# Patient Record
Sex: MALE | Race: WHITE | NOT HISPANIC OR LATINO | Employment: FULL TIME | URBAN - METROPOLITAN AREA
[De-identification: names, ages, dates, MRNs, and addresses within clinical notes are randomized per-mention and may not be internally consistent; named-entity substitution may affect disease eponyms.]

---

## 2017-01-18 ENCOUNTER — ALLSCRIPTS OFFICE VISIT (OUTPATIENT)
Dept: OTHER | Facility: OTHER | Age: 32
End: 2017-01-18

## 2017-02-07 ENCOUNTER — ALLSCRIPTS OFFICE VISIT (OUTPATIENT)
Dept: RADIOLOGY | Facility: CLINIC | Age: 32
End: 2017-02-07
Payer: COMMERCIAL

## 2017-02-13 ENCOUNTER — GENERIC CONVERSION - ENCOUNTER (OUTPATIENT)
Dept: OTHER | Facility: OTHER | Age: 32
End: 2017-02-13

## 2017-08-14 ENCOUNTER — ALLSCRIPTS OFFICE VISIT (OUTPATIENT)
Dept: OTHER | Facility: OTHER | Age: 32
End: 2017-08-14

## 2017-08-29 ENCOUNTER — ALLSCRIPTS OFFICE VISIT (OUTPATIENT)
Dept: RADIOLOGY | Facility: CLINIC | Age: 32
End: 2017-08-29
Payer: COMMERCIAL

## 2017-09-06 ENCOUNTER — GENERIC CONVERSION - ENCOUNTER (OUTPATIENT)
Dept: OTHER | Facility: OTHER | Age: 32
End: 2017-09-06

## 2018-01-03 ENCOUNTER — GENERIC CONVERSION - ENCOUNTER (OUTPATIENT)
Dept: OTHER | Facility: OTHER | Age: 33
End: 2018-01-03

## 2018-01-11 ENCOUNTER — GENERIC CONVERSION - ENCOUNTER (OUTPATIENT)
Dept: OTHER | Facility: OTHER | Age: 33
End: 2018-01-11

## 2018-01-11 NOTE — PROGRESS NOTES
Assessment    1  Disc degeneration, lumbar (722 52) (M51 36)   2  Spondylosis of lumbosacral region without myelopathy or radiculopathy (721 3)   (M47 817)    Plan    Complete risks and benefits of the procedure were reviewed including bleeding, infection, tissue reaction, allergic reaction and nerve injury with verbal and written consent being obtained  Discussion/Summary    Patient is a 64-year-old male with history of low back pain/lumbar radiculopathy  Previous lumbar epidural steroid injection relieved his pain  Today patient states he has some dull achiness on his low back  Most recent MRI finding demonstrates degenerative disc disease/spondylosis at L4-L5, and L5-S1  At this time, I recommend a trial of a bilateral medial branch block at L4-L5, L5-S1  Complete risks and benefits including bleeding, infection, tissue reaction, allergic reaction were discussed  Verbal and written consent were obtained  In addition patient continues to have maintained on Mobic 15 mg daily, and hydrocodone once a day  I explained to the patient that he needs to slowly wean off the hydrocodone in the near future  Possible side effects of new medications were reviewed with the patient/guardian today  The treatment plan was reviewed with the patient/guardian  The patient/guardian understands and agrees with the treatment plan   The treatment plan was reviewed with the patient/guardian  The patient/guardian understands and agrees with the treatment plan   The patient was counseled regarding diagnostic results, instructions for management, risk factor reductions, prognosis, patient and family education, impressions, risks and benefits of treatment options and importance of compliance with treatment  KVNG's low back pain persists despite time, relative rest, activity modification and therapy  Based on the patient's symptoms and examination, I suspect that his pain is being generated by the lumbar facet joints   The facet joints are only one of many possible low back pain generators  Unfortunately, studies have demonstrated that history and examination alone are unreliable  We will schedule the patient for diagnosistc lumbar medial branch blockade using a double block paradigm  If the patient receives significant pain relief of appropriate duration with lidocaine 2% we will confirm with Marcaine 0 75%  If the patient demonstrates appropriate response to medial branch blockade we will schedule for radiofrequency ablation of the blocked nerves to provide long-term relief  In the office today, we reviewed the nature of KVNG's pathology in depth using diagrams and models  We discussed the approach we would use for the medial branch block and provided literature for home review  The patient understands the risks associated with the procedure including bleeding, infection, tissue injury, allergic reaction and paralysis and provided written and verbal consent in the office today  There are risks associated with opiod medications, including dependence, addiction and tolerance  The patient understands and agrees to use these medications only as prescribed  Potential side effects of the medications include, but are not limited to, constipation, drowsiness, addiction, impaired judgment and risk of fatal overdose if not taken as prescribed  Sharing medications is a felony  At this point and time, the patient is showing no signs of addiction, abuse, diversion or suicidal ideation  Chief Complaint    1  Back Pain    History of Present Illness  Patient is a 26 y o male who is here for follow up visit  He has had a series of lumbar epidural steroid injections which provided relief of shooting leg pain  However he continues to complain of dull/achy back pain  Currently patient takes hydrocodone 5/325 mg prn as well as meloxicam 15 mg daily and Lyrica 75 mg twice a day  He states that this has helped with some of the pain   He reports adequate analgesia, he is performing his ADLs, no side effects from the medicines, no aberrant behavior  He denies any loss of bladder or bowel  No fever no chills  No recent trauma  He is in search for other alternatives to help with his low back ache  Referring physician is  Dr Jose Messina   Primary Care physician is  Daniel 46 presents with complaints of gradual onset of intermittent episodes of moderate bilateral lower back pain, described as sharp  On a scale of 1 to 10, the patient rates the pain as 4  Symptoms are improved by opioid analgesics  Symptoms are unchanged  Review of Systems    Constitutional: no fever, no recent weight gain and no recent weight loss  Eyes: no double vision and no blurry vision  Cardiovascular: no chest pain, no palpitations and no lower extremity edema  Respiratory: no complaints of shortness of breath and no wheezing  Musculoskeletal: difficulty walking and joint stiffness, but no muscle weakness, no joint swelling, no limb swelling`, no pain in extremity and no decreased range of motion  Neurological: no dizziness, no difficulty swallowing, no memory loss, no loss of consciousness and no seizures  Gastrointestinal: no nausea, no vomiting, no constipation and no diarrhea  Genitourinary: no difficulty initiating urine stream, no genital pain and no frequent urination  Integumentary: no complaints of skin rash  Psychiatric: no depression  Endocrine: no excessive thirst, no adrenal disease, no hypothyroidism and no hyperthyroidism  Hematologic/Lymphatic: no tendency for easy bruising and no tendency for easy bleeding  Active Problems    1  Abscess of skin (682 9) (L02 91)   2  Anxiety (300 00) (F41 9)   3  Backache (724 5) (M54 9)   4  Contact dermatitis due to poison ivy (692 6) (L23 7)   5  Disc degeneration, lumbar (722 52) (M51 36)   6  Flu vaccine need (V04 81) (Z23)   7   Lumbar radiculopathy (724 4) (M54 16)   8  PPD screening test (V74 1) (Z11 1)    Past Medical History    1  Anxiety (300 00) (F41 9)   2  Flu vaccine need (V04 81) (Z23)   3  History of Herniated lumbar intervertebral disc (722 10) (M51 26)   4  History of Lower Back Sprain (846 9)   5  History of Testicular discomfort (608 9) (N50 9)    The active problems and past medical history were reviewed and updated today  Surgical History    1  History of Knee Surgery    The surgical history was reviewed and updated today  Family History    1  Family history of malignant neoplasm (V16 9) (Z80 9)    2  Family history of No Significant Family History    The family history was reviewed and updated today  Social History    · Alcohol Use (History)   · Caffeine Use   · Denied: History of Drug Use   · Living With Significant Other   · Marital History - Single   · Never A Smoker   · Never Used Drugs   · Occupation:   · Sexually Active   · Denied: History of Sexually Active With Persons At Risk For HIV-related Disease  The social history was reviewed and updated today  The social history was reviewed and is unchanged  Current Meds   1  Gabapentin 300 MG Oral Capsule; TAKE 1 CAPSULE AT BEDTIME; Therapy: 52Qke7256 to (Evaluate:75Cps0438)  Requested for: 74Vuk8744; Last   Rx:88Bnz6530 Ordered   2  Hydrocodone-Acetaminophen 5-325 MG Oral Tablet; take 1 tab q6hrs; Therapy: 71Dia0426 to (Evaluate:88Oij4702); Last Rx:12Jan2016 Ordered   3  LORazepam 0 5 MG Oral Tablet; TAKE 1 TABLET 3 TIMES DAILY AS NEEDED; Therapy: 48QHS0953 to (Evaluate:07Dlc8688); Last Rx:90Dvs5850 Ordered   4  Medrol (Reg) 4 MG Oral Tablet; Take as directed on pack; Therapy: 33KEH6765 to (Last Rx:61Hok6174)  Requested for: 42ZLY0048 Ordered   5  Meloxicam 15 MG Oral Tablet; TAKE 1 TABLET DAILY; Therapy: 70See5689 to (Evaluate:49Zop5367); Last Rx:19Jan2015 Ordered   6  Methocarbamol 750 MG Oral Tablet; TAKE 1 TABLET 3 TIMES DAILY;    Therapy: 20LBJ9528 to (Teddy Baer)  Requested for: 20RDQ4558; Last   Rx:21Oct2015 Ordered   7  Triamcinolone Acetonide 0 1 % External Cream; apply to affected area 3 times a day; Therapy: 04FLC9362 to (Timbo Peralta)  Requested for: 71FEV7654; Last   Rx:11Geu2205 Ordered    The medication list was reviewed and updated today  Allergies    1  No Known Drug Allergies    Vitals  Vital Signs [Data Includes: Current Encounter]    Recorded: 31LIT3913 11:18AM   Temperature 97 1 F   Heart Rate 64   Respiration 14   Systolic 850   Diastolic 80   Height 5 ft 9 in   Weight 200 lb    BMI Calculated 29 53   BSA Calculated 2 06   Pain Scale 4     Physical Exam    Constitutional   General appearance: Well developed, well nourished, alert, in no distress, non-toxic and no overt pain behavior  Pulmonary   Respiratory effort: Even and unlabored  Cardiovascular   Examination of extremities: No edema or pitting edema present  Pedal pulses: 2+ bilaterally  Skin   Skin and subcutaneous tissue: Normal without rashes or lesions, well hydrated  Psychiatric   Mood and affect: Mood and affect appropriate  Neurologic   the muscle tone was normal   Musculoskeletal   Gait and station: Normal     Joint Exam:    Lumbar/Sacral Spine examination demonstrates Lumbosacral Spine:   Appearance: Normal  Spinal alignment exhibits normal lordosis  Tenderness: at lumbar spine, right paraspinal and left paraspinal    Lumbosacral Spine Sensory: intact to light touch and pinprick in the lower extremities  ROM Lumbosacral Spine: Full  Flexion was not restricted and was painless  Extension was not restricted and was painless  Left lateral flexion was not restricted and was painless  Right lateral flexion was not restricted and was painless  ROM Hips: Full   Foot and ankle strength was normal bilaterally  Right Foot Plantar Flexion: + 4/5  Left Foot Plantar Flexion: 4/5  Right Foot Dorsiflexion: + 4/5  Left Foot Dorsiflexion: 4/5  Right Ankle Inversion: + 4/5  Left Ankle Inversion: 4/5  Right Ankle Eversion: + 4/5  Left Ankle Eversion: 4/5  Knee strength was normal bilaterally  Right Knee Flexion: + 4/5  Left Knee Flexion: + 3/5  Right Knee Extension: + 4/5  Left Knee Extension: + 3/5  Evaluation of Muscle Stretch Reflexes on the right side demonstrates 3/4 Knee Jerk Reflex  Evaluation of Muscle Stretch Reflexes on the left side demonstrates 3/4 Knee Jerk Reflex  Special Tests: negative Straight Leg Raise on right and negative Straight Leg Raise on left  Results/Data  Results Free Text Form [facility]:   Results     Other  Last Hydrocodone dose-1/24/16        Future Appointments    Date/Time Provider Specialty Site   02/02/2016 08:45 AM MD [facility] [address] MRI OUTPATIENT     Signatures   Electronically signed by : Rahul Sow MD; Jan 25 2016 11:48AM EST                       (Author)

## 2018-01-11 NOTE — RESULT NOTES
Message   Recorded as Task   Date: 02/13/2017 10:17 AM, Created By: Sin Champagne   Task Name: Follow Up   Assigned To: Kayli Belle   Regarding Patient: Bruno Lake, Status: Active   Comment:    Kayli Belle - 13 Feb 2017 10:17 AM     TASK CREATED  F/u procedure L L4 and L5 TFESI on 2/7/17  Pt  reports excellent ncrumie-21-37% improvement in low back pain  No longer has radiation to his legs  Only rates his pain as a 2/10 and describes is as an ache  Will call back if he develops any problems     Baron Espino - 13 Feb 2017 10:36 AM     TASK REPLIED TO: Previously Assigned To Baron cochran md aware        Signatures   Electronically signed by : Shay Hart, ; Feb 13 2017  2:04PM EST                       (Author)

## 2018-01-12 NOTE — RESULT NOTES
Message   Recorded as Task   Date: 09/05/2017 02:02 PM, Created By: Ashleigh Boyer   Task Name: Miscellaneous   Assigned To: Ashleigh Boyer   Regarding Patient: Filipe Perdomo, Status: Active   CommentNorma Bartlett - 05 Sep 2017 2:02 PM     TASK CREATED  fup procedure lt L4 & L5 tfesi 8-29-17    No fup scheduled    patient got 70% improvement      has some soreness in back but is not going down leg   Yvette Trujillo - 05 Sep 2017 4:28 PM     TASK REPLIED TO: Previously Assigned To Yadeil   Electronically signed by : Satinder Vasquez, ; Sep  6 2017  6:34AM EST                       (Author)

## 2018-01-12 NOTE — RESULT NOTES
Message   Recorded as Task   Date: 02/23/2016 03:41 PM, Created By: Ro Hitchcock   Task Name: Follow Up   Assigned To: Kaleb Soria   Regarding Patient: Monique Rojas, Status: In Progress   Comment:    Melinda Cantrell - 23 Feb 2016 3:41 PM     TASK CREATED  F/u procedure call-message left  Pt had BL L4-L5 MBB #2 2/16  Pain diary received  Kayli Belle - 24 Feb 2016 12:52 PM     TASK EDITED  lmomb   Ro Hitchcock - 25 Feb 2016 2:42 PM     TASK IN PROGRESS   Jessy Clark - 25 Feb 2016 3:24 PM     TASK REASSIGNED: Previously Assigned To Kaleb Soria  lmovm returned a call cb # [phone]   Kayli Belle - 26 Feb 2016 11:05 AM     TASK REASSIGNED: Previously Assigned To SPA marcus downingrical,Team  [facility] Center[facility]y Harris - 26 Feb 2016 12:18 PM     TASK REPLIED TO: Previously Assigned To Kaleb Soria  returned call everything went good if you need to reach him please call work number and ask for him @ [address] [place] [address] 26 Feb 2016 1:36 PM     TASK REASSIGNED: Previously Assigned To SPA es clerical,Team  F/u procedure-B/L L4-L5 MBB #2 on 2/16/16  Pt did very well following this injection-had a % improvement in low back pain that lasted for approx 12 hrs  Is interested in the RFA    Can be reached at [facility] 29 - 27 Feb 2016 10:24 PM     TASK REPLIED TO: Previously Assigned To Felisha Richardson - 04 Mar 2016 4:45 PM     TASK REPLIED TO: Previously Assigned To Felisha Gutierrez  faxed req to ins   Felisha Gutierrez - 11 Mar 2016 2:49 PM     TASK REPLIED TO: Previously Assigned To Giancarlo Williamson on file, pt is scheduled        Verified Results  (1) CBC/PLT/DIFF [id number] 07:40AM Eliza Kendrick Order Number: [id number]     Order Number: [id number]     Test Name Result Flag Reference   WBC COUNT 6 06 Thousand/uL  4 31-10 16   RBC COUNT 4 85 Million/uL  3 88-5 62   HEMOGLOBIN 14 7 g/dL  12 0-17 0   HEMATOCRIT 44 3 %  36 5-49 3   MCV 91 fL  82-98   MCH 30 3 pg  26 8-34 3   MCHC 33 2 g/dL  31 4-37 4   RDW 13 9 %  11 6-15 1   MPV 11 3 fL  8 9-12 7   PLATELET COUNT 348 Thousands/uL  149-390   nRBC AUTOMATED 0 /100 WBCs     NEUTROPHILS RELATIVE PERCENT 44 %  43-75   LYMPHOCYTES RELATIVE PERCENT 44 %  14-44   MONOCYTES RELATIVE PERCENT 10 %  4-12   EOSINOPHILS RELATIVE PERCENT 2 %  0-6   BASOPHILS RELATIVE PERCENT 0 %  0-1   NEUTROPHILS ABSOLUTE COUNT 2 63 Thousands/µL  1 85-7 62   LYMPHOCYTES ABSOLUTE COUNT 2 68 Thousands/µL  0 60-4 47   MONOCYTES ABSOLUTE COUNT 0 59 Thousand/µL  0 17-1 22   EOSINOPHILS ABSOLUTE COUNT 0 13 Thousand/µL  0 00-0 61   BASOPHILS ABSOLUTE COUNT 0 02 Thousands/µL  0 00-0 10     (1) COMPREHENSIVE METABOLIC PANEL [id number] 87:56DG Fahad Reading Order Number: [id number]      National Kidney Disease Education Program recommendations are as follows:  GFR calculation is accurate only with a steady state creatinine  Chronic Kidney disease less than 60 ml/min/1 73 sq  meters  Kidney failure less than 15 ml/min/1 73 sq  meters  Test Name Result Flag Reference   GLUCOSE,RANDM 106 mg/dL     SODIUM 140 mmol/L  136-145   POTASSIUM 4 7 mmol/L  3 5-5 3   CHLORIDE 106 mmol/L  100-108   CARBON DIOXIDE 29 mmol/L  21-32   ANION GAP (CALC) 5 mmol/L  4-13   BLOOD UREA NITROGEN 22 mg/dL  5-25   CREATININE 1 23 mg/dL  0 60-1 30   Standardized to IDMS reference method   CALCIUM 8 9 mg/dL  8 3-10 1   BILI, TOTAL 0 40 mg/dL  0 20-1 00   ALK PHOSPHATAS 54 U/L     ALT (SGPT) 25 U/L  12-78   AST(SGOT) 14 U/L  5-45   ALBUMIN 3 9 g/dL  3 5-5 0   TOTAL PROTEIN 6 7 g/dL  6 4-8 2   eGFR Non-African American      >60 0 ml/min/1 73sq m     (1) TSH WITH FT4 REFLEX [id number] 07:40AM Fahad Reading Order Number: [id number]    Patients undergoing fluorescein dye angiography may retain small amounts of fluorescein in the body for 48-72 hours post procedure   Samples containing fluorescein can produce falsely depressed TSH values  If the patient had this procedure,a specimen should be resubmitted post fluorescein clearance  Test Name Result Flag Reference   TSH 1 420 uIU/mL  0 358-3 740     (1) LIPID PANEL FASTING W DIRECT LDL REFLEX [id number] 07:40AM Josue vivitas Order Number: [id number]      Triglyceride:         Normal              <150 mg/dl       Borderline High    150-199 mg/dl       High               200-499 mg/dl       Very High          >499 mg/dl  Cholesterol:         Desirable        <200 mg/dl      Borderline High  200-239 mg/dl      High             >239 mg/dl  HDL Cholesterol:        High    >59 mg/dL      Low     <41 mg/dL  LDL Cholesterol:        Optimal          <100 mg/dl         Near Optimal     100-129 mg/dl        Above Optimal          Borderline High   130-159 mg/dl          High              160-189 mg/dl          Very High        >189 mg/dl  LDL CALCULATED:    This screening LDL is a calculated result  It does not have the accuracy of the Direct Measured LDL in the monitoring of patients with hyperlipidemia and/or statin therapy  Direct Measure LDL (XSZ072) must be ordered separately in these patients  Test Name Result Flag Reference   CHOLESTEROL 152 mg/dL     LDL CHOLESTEROL CALCULATED 82 mg/dL  0-100   TRIGLYCERIDES 82 mg/dL  <=150   HDL,DIRECT 54 mg/dL  40-60     (1) TESTOSTERONE, FREE (DIRECT) AND TOTAL [id number] 07:40AM Josue vivitas Order Number: [id number]    Performed at:  [address] [place] [address]  [id number]  : Jeffrey Vazquez MD, Phone:  [phone]     Test Name Result Flag Reference   FREE TESTOSTERONE, DIRECT 9 4 pg/mL  8 7 - 25 1   COMMENT Comment     Adult male reference interval is based on a population of lean malesup to 36years old     TESTOSTERONE (TOTAL) 471 ng/dL  348 - 1197     (1) RAO SCREEN W/REFLEX TO TITER/PATTERN [id number] 07:40AM Josue [employer] Order Number: [id number]     Test Name Result Flag Reference   RAO SCREEN   Negative  Negative     (1) MONO TEST [id number] 07:40AM Cruzito Morrison Order Number: [id number]     Test Name Result Flag Reference   [facility] TEST Negative  Negative       Signatures   Electronically signed by : Des Landon, ; Mar 11 2016  2:56PM EST                       (Author)

## 2018-01-12 NOTE — RESULT NOTES
Message   labs are all normal     Verified Results  (1) CBC/PLT/DIFF [id number] 07:40AM [facility] Order Number: [id number]    TW Order Number: [id number]     Test Name Result Flag Reference   WBC COUNT 6 06 Thousand/uL  4 31-10 16   RBC COUNT 4 85 Million/uL  3 88-5 62   HEMOGLOBIN 14 7 g/dL  12 0-17 0   HEMATOCRIT 44 3 %  36 5-49 3   MCV 91 fL  82-98   MCH 30 3 pg  26 8-34 3   MCHC 33 2 g/dL  31 4-37 4   RDW 13 9 %  11 6-15 1   MPV 11 3 fL  8 9-12 7   PLATELET COUNT 870 Thousands/uL  149-390   nRBC AUTOMATED 0 /100 WBCs     NEUTROPHILS RELATIVE PERCENT 44 %  43-75   LYMPHOCYTES RELATIVE PERCENT 44 %  14-44   MONOCYTES RELATIVE PERCENT 10 %  4-12   EOSINOPHILS RELATIVE PERCENT 2 %  0-6   BASOPHILS RELATIVE PERCENT 0 %  0-1   NEUTROPHILS ABSOLUTE COUNT 2 63 Thousands/µL  1 85-7 62   LYMPHOCYTES ABSOLUTE COUNT 2 68 Thousands/µL  0 60-4 47   MONOCYTES ABSOLUTE COUNT 0 59 Thousand/µL  0 17-1 22   EOSINOPHILS ABSOLUTE COUNT 0 13 Thousand/µL  0 00-0 61   BASOPHILS ABSOLUTE COUNT 0 02 Thousands/µL  0 00-0 10     (1) COMPREHENSIVE METABOLIC PANEL [id number] 14:74XF [facility] Order Number: [id number]      National Kidney Disease Education Program recommendations are as follows:  GFR calculation is accurate only with a steady state creatinine  Chronic Kidney disease less than 60 ml/min/1 73 sq  meters  Kidney failure less than 15 ml/min/1 73 sq  meters       Test Name Result Flag Reference   GLUCOSE,RANDM 106 mg/dL     SODIUM 140 mmol/L  136-145   POTASSIUM 4 7 mmol/L  3 5-5 3   CHLORIDE 106 mmol/L  100-108   CARBON DIOXIDE 29 mmol/L  21-32   ANION GAP (CALC) 5 mmol/L  4-13   BLOOD UREA NITROGEN 22 mg/dL  5-25   CREATININE 1 23 mg/dL  0 60-1 30   Standardized to IDMS reference method   CALCIUM 8 9 mg/dL  8 3-10 1   BILI, TOTAL 0 40 mg/dL  0 20-1 00   ALK PHOSPHATAS 54 U/L     ALT (SGPT) 25 U/L  12-78   AST(SGOT) 14 U/L  5-45   ALBUMIN 3 9 g/dL  3 5-5 0   TOTAL PROTEIN 6 7 g/dL  6 4-8 2   eGFR Non-African American      >60 0 ml/min/1 73sq m     (1) TSH WITH FT4 REFLEX [id number] 07:40AM [employer] Order Number: [id number]    Patients undergoing fluorescein dye angiography may retain small amounts of fluorescein in the body for 48-72 hours post procedure  Samples containing fluorescein can produce falsely depressed TSH values  If the patient had this procedure,a specimen should be resubmitted post fluorescein clearance  Test Name Result Flag Reference   TSH 1 420 uIU/mL  0 358-3 740     (1) LIPID PANEL FASTING W DIRECT LDL REFLEX [id number] 07:40AM Luis Manuel [employer] Order Number: [id number]      Triglyceride:         Normal              <150 mg/dl       Borderline High    150-199 mg/dl       High               200-499 mg/dl       Very High          >499 mg/dl  Cholesterol:         Desirable        <200 mg/dl      Borderline High  200-239 mg/dl      High             >239 mg/dl  HDL Cholesterol:        High    >59 mg/dL      Low     <41 mg/dL  LDL Cholesterol:        Optimal          <100 mg/dl         Near Optimal     100-129 mg/dl        Above Optimal          Borderline High   130-159 mg/dl          High              160-189 mg/dl          Very High        >189 mg/dl  LDL CALCULATED:    This screening LDL is a calculated result  It does not have the accuracy of the Direct Measured LDL in the monitoring of patients with hyperlipidemia and/or statin therapy  Direct Measure LDL (TJK073) must be ordered separately in these patients       Test Name Result Flag Reference   CHOLESTEROL 152 mg/dL     LDL CHOLESTEROL CALCULATED 82 mg/dL  0-100   TRIGLYCERIDES 82 mg/dL  <=150   HDL,DIRECT 54 mg/dL  40-60     (1) TESTOSTERONE, FREE (DIRECT) AND TOTAL [id number] 07:40AM Luis Manuel [employer] Order Number: [id number]    Performed at:  [address]  [id number]  : Jesus Sims MD, Phone:  [phone]     Test Name Result Flag Reference   FREE TESTOSTERONE, DIRECT 9 4 pg/mL  8 7 - 25 1   COMMENT Comment     Adult male reference interval is based on a population of lean malesup to 36years old  TESTOSTERONE (TOTAL) 471 ng/dL  348 - 1197     (1) RAO SCREEN W/REFLEX TO TITER/PATTERN [id number] 07:40AM Tavares Marquis Order Number: [id number]     Test Name Result Flag Reference   RAO SCREEN   Negative  Negative     (1) MONO TEST [id number] 07:40AM Tavares Marquis Order Number: [id number]     Test Name Result Flag Reference   MONO TEST Negative  Negative

## 2018-01-13 VITALS
HEART RATE: 64 BPM | BODY MASS INDEX: 25.22 KG/M2 | DIASTOLIC BLOOD PRESSURE: 83 MMHG | WEIGHT: 218 LBS | HEIGHT: 78 IN | SYSTOLIC BLOOD PRESSURE: 112 MMHG

## 2018-01-14 VITALS
DIASTOLIC BLOOD PRESSURE: 82 MMHG | HEIGHT: 69 IN | SYSTOLIC BLOOD PRESSURE: 124 MMHG | WEIGHT: 213 LBS | HEART RATE: 64 BPM | BODY MASS INDEX: 31.55 KG/M2

## 2018-01-17 NOTE — RESULT NOTES
Message   Recorded as Task   Date: 02/03/2016 10:15 AM, Created By: Torito Humphries   Task Name: Follow Up   Assigned To: Travis Muhammad   Regarding Patient: Memo Cardosocolby, Status: Active   CommentLuricci Contreras - 03 Feb 2016 10:15 AM     TASK CREATED  F/u procedure-B/L L4-L5 MBB on 2/2/16  Pt reports a 95+% improvement in low back pain that has even carried over to today  Worked very hard yesterday, moving furniture and only rated his back pain as a 0-2/10  Is interested in 2nd MBB  Kayli Belle - 03 Feb 2016 10:16 AM     TASK EDITED  Pain diary scanned   Dilcia Ríos - 03 Feb 2016 12:52 PM     TASK REPLIED TO: Previously Assigned To Felisha Gutierrez second mbb   Felisha Gutierrez - 10 Feb 2016 10:20 AM     TASK REPLIED TO: Previously Assigned To Travis Muhammad  pt is scheduled        Signatures   Electronically signed by : Ligia Gross, ; Feb 10 2016 11:46AM EST                       (Author)

## 2018-01-17 NOTE — RESULT NOTES
Message   Recorded as Task   Date: 03/16/2016 07:44 AM, Created By: Rohini Puckett   Task Name: Follow Up   Assigned To: Lokesh Fitch   Regarding Patient: Maikel Bethea, Status: Active   CommentSusagauri Hernandezin - 16 Mar 2016 7:44 AM     TASK CREATED  F/u L L4-L5 RFA on 3/15/16  [facility]   [facility] - 16 Mar 2016 10:56 AM     TASK EDITED  F/u procedure-L L4-L5 RFA on 3/1516  Pt reports site sorness that he rates a 3/10, but no actual pain  Site looks clean and dry with no s/s of infection-redness, drainage, or swelling  No fever  No sunburn like sensation  Has a f/u appoint for R RFA on 3/31     Justine Ferrari - 69 Mar 2016 12:49 PM     TASK REPLIED TO: Previously Assigned To Justine cochran md aware        Signatures   Electronically signed by : Reilly Gilbert, ; Mar 16 2016  1:10PM EST                       (Author)

## 2018-01-18 NOTE — RESULT NOTES
Message   Recorded as Task   Date: 04/01/2016 08:08 AM, Created By: Teresa Gonzalez   Task Name: Follow Up   Assigned To: Shirley Granger   Regarding Patient: Cami Calhoun, Status: Active   CommentPhylis Sully - 01 Apr 2016 8:08 AM     TASK CREATED  F/u procedure-R L4-L5 RFA on 3/31/16 [facility]   Kayli Belle - 01 Apr 2016 8:09 AM     TASK REASSIGNED: Previously Assigned To Pascual Wills - 01 Apr 2016 2:15 PM     TASK EDITED  F/u procedure-R L4-L5 RFA on 3/31/16  Pt reports some site soreness that he rates a 4/10, but site is clean and dry with no s/s of infection  Is working today with no problems  No fever  No sunburn like sensation  Has a f/u appoint on 4/29  Understands to call before that with any problems     Gaby Gonzalez - 01 Apr 2016 3:00 PM     TASK REPLIED TO: Previously Assigned To Gaby cochran md aware        Signatures   Electronically signed by : Mahi Lujan, ; Apr 1 2016  3:15PM EST                       (Author)

## 2018-01-23 NOTE — MISCELLANEOUS
Message   Recorded as Task   Date: 12/22/2017 03:37 PM, Created By: Marcello Puentes   Task Name: Follow Up   Assigned To: [facility] clinical,Team   Regarding Patient: Daniel Vang, Status: Complete   Comment:    Marcello Legacy - 22 Dec 2017 3:37 PM     TASK CREATED  Received phone message that pt had pain and wanted cb  Marcello Legacy - 22 Dec 2017 3:38 PM     TASK IN PROGRESS   Melinda Cantrell - 22 Dec 2017 3:38 PM     TASK EDITED  lmom to Eddie Razo - 22 Dec 2017 5:03 PM     TASK EDITED  patient called back   Adriane Handy - 22 Dec 2017 5:41 PM     TASK EDITED   Rana Delmar - 26 Dec 2017 9:04 AM     TASK EDITED  LMOM as per release of health form to c/b, c/b# and OH given  ÓscarMelinda - 29 Dec 2017 8:37 AM     TASK EDITED  S/w pt  C/o left lower back pain  Pt would like to repeat RFA, last done 3/2016  OK to schedule? Ramon Arnold - 29 Dec 2017 8:45 AM     TASK REPLIED TO: Previously Assigned To Ramon Arnold  schedule ov with me or MYERS next available to discuss   Marcello Legacy - 29 Dec 2017 8:48 AM     TASK REASSIGNED: Previously Assigned To [facility] clinical,Team   Jessy Clark - 02 Jan 2018 9:11 AM     TASK  [address] - 02 Jan 2018 9:12 AM     TASK REPLIED TO: Previously Assigned To Rosario Das lmovm to cb to sched appt for sovs with peyton to discuss poss inj   Torri Vicente - 03 Jan 2018 2:24 PM     TASK EDITED  Appt scheduled with Peyton on 1/4/18 at 11:40am    Torri Vicente - 03 Jan 2018 2:24 PM     TASK COMPLETED        Active Problems    1  Anxiety (300 00) (F41 9)   2  Backache (724 5) (M54 9)   3  Disc degeneration, lumbar (722 52) (M51 36)   4  Fatigue (780 79) (R53 83)   5  Lumbar radiculopathy (724 4) (M54 16)   6  PPD screening test (V74 1) (Z11 1)   7  Screening, lipid (V77 91) (Z13 220)   8  Spondylosis of lumbosacral region without myelopathy or radiculopathy (721 3)   (M47 817)   9   Tick bite (919 4,E906 4) (W57 XXXA)    Current Meds   1  Cyclobenzaprine HCl - 10 MG Oral Tablet; TAKE 1 TABLET 3 TIMES DAILY AS NEEDED; Therapy: 71IQP5514 to (Evaluate:13Oct2017)  Requested for: 59REP6178; Last   Rx:[id number] Ordered   2  Medrol 4 MG Oral Tablet Therapy Pack (MethylPREDNISolone); follow directions on   pack; Therapy: 47YQW9871 to (Evaluate:76Zja5323)  Requested for: 19MGA9929; Last   Rx:[id number] Ordered   3  Meloxicam 15 MG Oral Tablet (Mobic); TAKE 1 TABLET DAILY; Therapy: 58Yvq6875 to (Evaluate:92Ytx1053)  Requested for: 82Yzj0644; Last   Rx:[id number] Ordered    Allergies    1   No Known Drug Allergies    Signatures   Electronically signed by : Jami Kaur, ; Johnny  3 2018  3:56PM EST                       (Author)

## 2018-01-24 VITALS — WEIGHT: 212 LBS | DIASTOLIC BLOOD PRESSURE: 60 MMHG | BODY MASS INDEX: 31.31 KG/M2 | SYSTOLIC BLOOD PRESSURE: 120 MMHG

## 2018-01-30 ENCOUNTER — HOSPITAL ENCOUNTER (OUTPATIENT)
Dept: RADIOLOGY | Facility: CLINIC | Age: 33
Discharge: HOME | End: 2018-01-30
Attending: ANESTHESIOLOGY
Payer: COMMERCIAL

## 2018-01-30 VITALS
RESPIRATION RATE: 16 BRPM | HEART RATE: 63 BPM | DIASTOLIC BLOOD PRESSURE: 83 MMHG | SYSTOLIC BLOOD PRESSURE: 125 MMHG | OXYGEN SATURATION: 96 % | TEMPERATURE: 98.6 F

## 2018-01-30 DIAGNOSIS — M47.816 LUMBAR SPONDYLOSIS: ICD-10-CM

## 2018-01-30 PROCEDURE — 64636 DESTROY L/S FACET JNT ADDL: CPT | Performed by: ANESTHESIOLOGY

## 2018-01-30 PROCEDURE — 64635 DESTROY LUMB/SAC FACET JNT: CPT | Performed by: ANESTHESIOLOGY

## 2018-01-30 RX ORDER — METHYLPREDNISOLONE ACETATE 80 MG/ML
80 INJECTION, SUSPENSION INTRA-ARTICULAR; INTRALESIONAL; INTRAMUSCULAR; SOFT TISSUE ONCE
Status: COMPLETED | OUTPATIENT
Start: 2018-01-30 | End: 2018-01-30

## 2018-01-30 RX ORDER — BUPIVACAINE HYDROCHLORIDE 2.5 MG/ML
10 INJECTION, SOLUTION EPIDURAL; INFILTRATION; INTRACAUDAL ONCE
Status: COMPLETED | OUTPATIENT
Start: 2018-01-30 | End: 2018-01-30

## 2018-01-30 RX ORDER — LIDOCAINE HYDROCHLORIDE 10 MG/ML
30 INJECTION, SOLUTION EPIDURAL; INFILTRATION; INTRACAUDAL; PERINEURAL ONCE
Status: COMPLETED | OUTPATIENT
Start: 2018-01-30 | End: 2018-01-30

## 2018-01-30 RX ADMIN — LIDOCAINE HYDROCHLORIDE 30 ML: 10 INJECTION, SOLUTION EPIDURAL; INFILTRATION; INTRACAUDAL; PERINEURAL at 11:08

## 2018-01-30 RX ADMIN — METHYLPREDNISOLONE ACETATE 80 MG: 80 INJECTION, SUSPENSION INTRA-ARTICULAR; INTRALESIONAL; INTRAMUSCULAR; SOFT TISSUE at 11:12

## 2018-01-30 RX ADMIN — BUPIVACAINE HYDROCHLORIDE 10 ML: 2.5 INJECTION, SOLUTION EPIDURAL; INFILTRATION; INTRACAUDAL; PERINEURAL at 11:11

## 2018-01-30 NOTE — DISCHARGE INSTRUCTIONS

## 2018-01-30 NOTE — H&P
History of Present Illness: The patient is a 27 y o  male who presents with complaints of low back ache  Patient is here today for a repeat Left L4, L5 RFA  Patient reports a 5/10  No recent changes in health  There is no problem list on file for this patient  History reviewed  No pertinent past medical history  History reviewed  No pertinent surgical history  No current outpatient prescriptions on file  No Known Allergies    Physical Exam:   Vitals:    01/30/18 1025   BP: 119/79   Pulse: 64   Resp: 18   Temp: 98 6 °F (37 °C)   SpO2: 96%     General: Awake, Alert, Oriented x 3, Mood and affect appropriate  Respiratory: Respirations even and unlabored  Cardiovascular: Peripheral pulses intact; no edema  Musculoskeletal Exam: Low back pain which is aggravated with lumbar spine extension  ASA Score: 2    Assessment:   1   Lumbar spondylosis        Plan: LEFT L4-L5 RFA

## 2018-02-02 ENCOUNTER — TELEPHONE (OUTPATIENT)
Dept: RADIOLOGY | Facility: CLINIC | Age: 33
End: 2018-02-02

## 2018-03-01 ENCOUNTER — TELEPHONE (OUTPATIENT)
Dept: PAIN MEDICINE | Facility: MEDICAL CENTER | Age: 33
End: 2018-03-01

## 2018-03-01 NOTE — TELEPHONE ENCOUNTER
Pt called requesting a call back - wants to ask what the next step in for his pain management  Not sure if he should schedule another procedure or try a different medication  Pt can be reached at [phone]

## 2018-03-02 ENCOUNTER — OFFICE VISIT (OUTPATIENT)
Dept: PAIN MEDICINE | Facility: CLINIC | Age: 33
End: 2018-03-02
Payer: COMMERCIAL

## 2018-03-02 VITALS — WEIGHT: 213.6 LBS | BODY MASS INDEX: 29.9 KG/M2 | RESPIRATION RATE: 14 BRPM | HEIGHT: 71 IN

## 2018-03-02 DIAGNOSIS — M47.816 LUMBAR SPONDYLOSIS: Primary | ICD-10-CM

## 2018-03-02 DIAGNOSIS — M53.3 SACROILIAC JOINT DYSFUNCTION OF LEFT SIDE: ICD-10-CM

## 2018-03-02 PROCEDURE — 99214 OFFICE O/P EST MOD 30 MIN: CPT | Performed by: ANESTHESIOLOGY

## 2018-03-02 NOTE — PROGRESS NOTES
Assessment:  1  Lumbar spondylosis    2  Sacroiliac joint dysfunction of left side - Left        Plan: This is a 27 y o male who presents today for follow up management of low back pain and left sided SI joint dysfunction  Patient had a left L4, L5 RFA which has helped with facet mediated pain  He has residual pain likely from Left SI joint dysfunction  Physical examination demonstrates positive left anaid's test and tenderness at the left SI Joint with palpation  He continues to undergo home exercises daily which helps temporarily but pain returns  I did have an in depth conversation with the patient today  Clearly, the patient's pain has persisted despite time, relative rest, activity modification as well as prior PT and home exercises  The patient's examination and symptoms would suggest an underlying sacroiliac joint dysfunction  I would recommend proceeding with [left] intra-articular sacroiliac joint injection under fluoroscopic guidance  The injection will serve both diagnostic and hopefully therapeutic roles for the patient  Complete risks and benefits including bleeding, infection, tissue reaction, allergic reaction were reviewed and verbal and written consent was obtained  History of Present Illness: The patient is a 27 y o  male who presents for a follow up office visit in regards to Back Pain  Low back pain began after a work -related injury which occurred back in 2012  He has undergone several pain intervention - including SWATHI and RFAs  The patient is status post Left L4, L5 RFA, on 1/30/18  The patient reports 65% pain relief following the procedure  The patients current symptoms include tenderness at the left buttock region with pain that radiates to the posterolateral thigh  Patient continues to work full-time  Current pain medications includes: none      I have personally reviewed and/or updated the patient's past medical history, past surgical history, family history, social history, current medications, allergies, and vital signs today  Review of Systems:  Review of Systems   Respiratory: Negative for shortness of breath  Cardiovascular: Negative for chest pain  Gastrointestinal: Negative for constipation, diarrhea, nausea and vomiting  Musculoskeletal: Negative for arthralgias, gait problem, joint swelling and myalgias  Skin: Negative for rash  Neurological: Negative for dizziness, seizures and weakness  All other systems reviewed and are negative  No past medical history on file  No past surgical history on file  No family history on file  Social History     Occupational History    Not on file  Social History Main Topics    Smoking status: Never Smoker    Smokeless tobacco: Never Used    Alcohol use Yes      Comment: socially    Drug use: No    Sexual activity: Not on file       No current outpatient prescriptions on file  No Known Allergies    Physical Exam:    Resp 14   Ht 5' 11" (1 803 m)   Wt 96 9 kg (213 lb 9 6 oz)   BMI 29 79 kg/m²     Constitutional:normal, well developed, well nourished, alert, in no distress and non-toxic and no overt pain behavior    Eyes:anicteric  HEENT:grossly intact  Neck:supple, symmetric, trachea midline and no masses   Pulmonary:even and unlabored  Cardiovascular:No edema or pitting edema present  Skin:Normal without rashes or lesions and well hydrated  Psychiatric:Mood and affect appropriate  Neurologic:Cranial Nerves II-XII grossly intact  Musculoskeletal:normal    Lumbar Spine Exam    Appearance:  Normal lordosis  Palpation/Tenderness:  left lumbar paraspinal tenderness  Sensory:  no sensory deficits noted  Range of Motion:  Extension:  Minimally limited  with pain  Motor Strength:  Left foot dorsiflexion:  4/5  Left foot plantar flexion:  4/5  Right foot dorsiflexion:  4/5  Right foot plantar flexion:  4/5  Reflexes:  Left Patellar:  2+   Right Patellar:  2+   Special Tests:  Left Aryan's Maneuver:  positive  Left Gaenslen's Test:  positive  Tenderness at the left SI joint   Imaging  FL spine and pain procedure    (Results Pending)       Orders Placed This Encounter   Procedures    FL spine and pain procedure

## 2018-03-15 ENCOUNTER — HOSPITAL ENCOUNTER (OUTPATIENT)
Dept: RADIOLOGY | Facility: CLINIC | Age: 33
Discharge: HOME | End: 2018-03-15
Attending: ANESTHESIOLOGY
Payer: COMMERCIAL

## 2018-03-15 VITALS
HEART RATE: 80 BPM | TEMPERATURE: 98.7 F | DIASTOLIC BLOOD PRESSURE: 85 MMHG | SYSTOLIC BLOOD PRESSURE: 135 MMHG | OXYGEN SATURATION: 98 % | RESPIRATION RATE: 18 BRPM

## 2018-03-15 DIAGNOSIS — M46.1 SACROILIITIS, NOT ELSEWHERE CLASSIFIED (HCC): ICD-10-CM

## 2018-03-15 RX ORDER — LIDOCAINE HYDROCHLORIDE 10 MG/ML
30 INJECTION, SOLUTION EPIDURAL; INFILTRATION; INTRACAUDAL; PERINEURAL ONCE
Status: COMPLETED | OUTPATIENT
Start: 2018-03-15 | End: 2018-03-15

## 2018-03-15 RX ORDER — BUPIVACAINE HCL/PF 2.5 MG/ML
10 VIAL (ML) INJECTION ONCE
Status: COMPLETED | OUTPATIENT
Start: 2018-03-15 | End: 2018-03-15

## 2018-03-15 RX ORDER — METHYLPREDNISOLONE ACETATE 80 MG/ML
80 INJECTION, SUSPENSION INTRA-ARTICULAR; INTRALESIONAL; INTRAMUSCULAR; PARENTERAL; SOFT TISSUE ONCE
Status: COMPLETED | OUTPATIENT
Start: 2018-03-15 | End: 2018-03-15

## 2018-03-15 RX ADMIN — LIDOCAINE HYDROCHLORIDE 30 ML: 10 INJECTION, SOLUTION EPIDURAL; INFILTRATION; INTRACAUDAL; PERINEURAL at 11:20

## 2018-03-15 RX ADMIN — BUPIVACAINE HYDROCHLORIDE 10 ML: 2.5 INJECTION, SOLUTION EPIDURAL; INFILTRATION; INTRACAUDAL at 11:22

## 2018-03-15 RX ADMIN — METHYLPREDNISOLONE ACETATE 80 MG: 80 INJECTION, SUSPENSION INTRA-ARTICULAR; INTRALESIONAL; INTRAMUSCULAR; SOFT TISSUE at 11:21

## 2018-03-15 RX ADMIN — IOHEXOL 1 ML: 300 INJECTION, SOLUTION INTRAVENOUS at 11:21

## 2018-03-15 NOTE — DISCHARGE INSTRUCTIONS
Epidural Steroid Injection   WHAT YOU NEED TO KNOW:   An epidural steroid injection (SWATHI) is a procedure to inject steroid medicine into the epidural space  The epidural space is between your spinal cord and vertebrae  Steroids reduce inflammation and fluid buildup in your spine that may be causing pain  You may be given pain medicine along with the steroids  ACTIVITY  · Do not drive or operate machinery today  · No strenuous activity today - bending, lifting, etc   · You may resume normal activites starting tomorrow - start slowly and as tolerated  · You may shower today, but no tub baths or hot tubs  · You may have numbness for several hours from the local anesthetic  Please use caution and common sense, especially with weight-bearing activities  CARE OF THE INJECTION SITE  · If you have soreness or pain, apply ice to the area today (20 minutes on/20 minutes off)  · Starting tomorrow, you may use warm, moist heat or ice if needed  · You may have an increase or change in your discomfort for 36-48 hours after your treatment  · Apply ice and continue with any pain medication you have been prescribed  · Notify [facility] if you have any of the following: redness, drainage, swelling, headache, stiff neck or fever above 100°F     SPECIAL INSTRUCTIONS  · Our office will contact you in approximately 7 days for a progress report  MEDICATIONS  · Continue to take all routine medications  · Our office may have instructed you to hold some medications  If you have a problem specifically related to your procedure, please call our office at [phone]  Problems not related to your procedure should be directed to your primary care physician

## 2018-03-15 NOTE — INTERVAL H&P NOTE
Update: (This section must be completed if the H&P was completed greater than 24 hrs to procedure or admission)    H&P reviewed  After examining the patient, I find no changed to the H&P since it had been written  Patient re-evaluated   Accept as history and physical     Mushtaq Cabrera MD/March 15, 2018/10:52 AM

## 2018-03-22 ENCOUNTER — TELEPHONE (OUTPATIENT)
Dept: RADIOLOGY | Facility: CLINIC | Age: 33
End: 2018-03-22

## 2018-03-22 NOTE — TELEPHONE ENCOUNTER
S/P Left SI joint injection on 3/15  States he feels pretty good  Only occasional pain at 2/10  Feels injection helped him 70%  Advised he could still have further pain relief as it can take 2 weeks for maximum benefit  Does not have f/u scheduled  Advised to make 8 week f/u at his earliest convenience  Pt agreeable, at work so he would call back

## 2019-03-12 ENCOUNTER — OFFICE VISIT (OUTPATIENT)
Dept: FAMILY MEDICINE CLINIC | Facility: CLINIC | Age: 34
End: 2019-03-12
Payer: COMMERCIAL

## 2019-03-12 VITALS
DIASTOLIC BLOOD PRESSURE: 84 MMHG | RESPIRATION RATE: 18 BRPM | HEIGHT: 71 IN | HEART RATE: 88 BPM | WEIGHT: 212 LBS | BODY MASS INDEX: 29.68 KG/M2 | OXYGEN SATURATION: 98 % | SYSTOLIC BLOOD PRESSURE: 116 MMHG

## 2019-03-12 DIAGNOSIS — Z23 NEED FOR TDAP VACCINATION: ICD-10-CM

## 2019-03-12 DIAGNOSIS — F41.9 EPISODE OF ANXIETY: Primary | ICD-10-CM

## 2019-03-12 PROCEDURE — 3008F BODY MASS INDEX DOCD: CPT | Performed by: NURSE PRACTITIONER

## 2019-03-12 PROCEDURE — 1036F TOBACCO NON-USER: CPT | Performed by: NURSE PRACTITIONER

## 2019-03-12 PROCEDURE — 99213 OFFICE O/P EST LOW 20 MIN: CPT | Performed by: NURSE PRACTITIONER

## 2019-03-12 NOTE — PATIENT INSTRUCTIONS
Chest pain episode- result of dietary intake and anxiety   No need for cardiac eval    Follow up if pain recurs

## 2019-03-12 NOTE — PROGRESS NOTES
Assessment/Plan:       Diagnoses and all orders for this visit:    Episode of anxiety        No problem-specific Assessment & Plan notes found for this encounter  Subjective:      Patient ID: Ashkan Busby is a 26 y o  male  Patient was seen in the Ed one week ago  He began to experience chest pain, which he describes as a heartburn pain  Then he became anxious and had a panic attack  He went to [facility] ER  He had EKG and labs which were all normal  He has had no further episodes of chest pain      The following portions of the patient's history were reviewed and updated as appropriate:   He has a past medical history of Anxiety and Herniated lumbar intervertebral disc ,  does not have any pertinent problems on file  ,   has a past surgical history that includes Knee surgery (2008)  ,  family history includes Cancer in his mother  ,   reports that he has never smoked  He has never used smokeless tobacco  He reports that he drinks alcohol  He reports that he does not use drugs  ,  has No Known Allergies     No current outpatient medications on file  No current facility-administered medications for this visit  Review of Systems   Constitutional: Negative for fatigue and fever  HENT: Negative for congestion  Eyes: Negative for visual disturbance  Respiratory: Negative for cough, chest tightness and shortness of breath  Cardiovascular: Negative for chest pain, palpitations and leg swelling  Gastrointestinal: Negative for abdominal distention and abdominal pain  Endocrine: Negative for cold intolerance, polydipsia and polyuria  Genitourinary: Negative for difficulty urinating  Musculoskeletal: Negative for back pain and joint swelling  Skin: Negative for color change and rash  Allergic/Immunologic: Negative for immunocompromised state  Neurological: Negative for dizziness and headaches  Hematological: Negative for adenopathy     Psychiatric/Behavioral: Negative for behavioral problems and sleep disturbance  Objective:  Vitals:    03/12/19 1703   BP: 116/84   BP Location: Left arm   Patient Position: Sitting   Pulse: 88   Resp: 18   SpO2: 98%   Weight: 96 2 kg (212 lb)   Height: 5' 11" (1 803 m)     Body mass index is 29 57 kg/m²  Physical Exam   Constitutional: He is oriented to person, place, and time  He appears well-developed and well-nourished  HENT:   Head: Normocephalic and atraumatic  Nose: Nose normal    Mouth/Throat: Oropharynx is clear and moist    Eyes: Pupils are equal, round, and reactive to light  Conjunctivae and EOM are normal  No scleral icterus  Neck: Normal range of motion  Neck supple  No JVD present  Cardiovascular: Normal rate, regular rhythm, normal heart sounds and intact distal pulses  Exam reveals no gallop and no friction rub  No murmur heard  Pulmonary/Chest: Effort normal and breath sounds normal  No respiratory distress  Abdominal: Soft  Bowel sounds are normal  There is no tenderness  Musculoskeletal: Normal range of motion  He exhibits no edema, tenderness or deformity  Lymphadenopathy:     He has no cervical adenopathy  Neurological: He is alert and oriented to person, place, and time  Skin: Skin is warm and dry  Psychiatric: He has a normal mood and affect  His behavior is normal  Judgment and thought content normal    Nursing note and vitals reviewed

## 2019-07-17 NOTE — PROGRESS NOTES
Assessment/Plan:       Diagnoses and all orders for this visit:    Well adult health check        No problem-specific Assessment & Plan notes found for this encounter  Subjective:      Patient ID: Ze Tucker is a 37 y o  male  Patient is here for work physical He is a [employer]  and a physical is required  He feels well without issues or concerns  The following portions of the patient's history were reviewed and updated as appropriate:   He has a past medical history of Anxiety and Herniated lumbar intervertebral disc ,  does not have any pertinent problems on file  ,   has a past surgical history that includes Knee surgery (2008)  ,  family history includes Cancer in his mother  ,   reports that he has never smoked  He has never used smokeless tobacco  He reports that he drinks alcohol  He reports that he does not use drugs  ,  has No Known Allergies     No current outpatient medications on file  No current facility-administered medications for this visit  Review of Systems   Constitutional: Negative for fatigue and fever  HENT: Negative for congestion  Eyes: Negative for visual disturbance  Respiratory: Negative for cough and shortness of breath  Cardiovascular: Negative for chest pain, palpitations and leg swelling  Gastrointestinal: Negative for abdominal distention and abdominal pain  Endocrine: Negative for cold intolerance, polydipsia and polyuria  Genitourinary: Negative for difficulty urinating  Musculoskeletal: Negative for back pain and joint swelling  Skin: Negative for color change and rash  Allergic/Immunologic: Negative for immunocompromised state  Neurological: Negative for dizziness and headaches  Hematological: Negative for adenopathy  Psychiatric/Behavioral: Negative for behavioral problems and sleep disturbance  All other systems reviewed and are negative          Objective:  Vitals:    07/18/19 1635   BP: 124/84   BP Location: Left arm   Patient Position: Sitting   Pulse: 68   Resp: 18   SpO2: 99%   Weight: 95 7 kg (211 lb)   Height: 5' 11" (1 803 m)     Body mass index is 29 43 kg/m²  Physical Exam   Constitutional: He is oriented to person, place, and time  He appears well-developed and well-nourished  No distress  HENT:   Head: Normocephalic and atraumatic  Right Ear: External ear normal    Left Ear: External ear normal    Nose: Nose normal    Mouth/Throat: Oropharynx is clear and moist  No oropharyngeal exudate  Eyes: Pupils are equal, round, and reactive to light  Conjunctivae and EOM are normal  Right eye exhibits no discharge  Left eye exhibits no discharge  No scleral icterus  Neck: Normal range of motion  Neck supple  No JVD present  No thyromegaly present  Cardiovascular: Normal rate, regular rhythm, normal heart sounds and intact distal pulses  Exam reveals no gallop and no friction rub  No murmur heard  Pulmonary/Chest: Effort normal and breath sounds normal  No respiratory distress  Abdominal: Soft  Bowel sounds are normal  He exhibits no distension  There is no tenderness  Musculoskeletal: Normal range of motion  He exhibits no edema or tenderness  Lymphadenopathy:     He has no cervical adenopathy  Neurological: He is alert and oriented to person, place, and time  He has normal reflexes  No cranial nerve deficit  Coordination normal    Skin: Skin is warm and dry  He is not diaphoretic  Psychiatric: He has a normal mood and affect  His behavior is normal  Judgment and thought content normal    Nursing note and vitals reviewed

## 2019-07-18 ENCOUNTER — OFFICE VISIT (OUTPATIENT)
Dept: FAMILY MEDICINE CLINIC | Facility: CLINIC | Age: 34
End: 2019-07-18
Payer: COMMERCIAL

## 2019-07-18 VITALS
WEIGHT: 211 LBS | SYSTOLIC BLOOD PRESSURE: 124 MMHG | RESPIRATION RATE: 18 BRPM | HEART RATE: 68 BPM | OXYGEN SATURATION: 99 % | BODY MASS INDEX: 29.54 KG/M2 | HEIGHT: 71 IN | DIASTOLIC BLOOD PRESSURE: 84 MMHG

## 2019-07-18 DIAGNOSIS — Z00.00 WELL ADULT HEALTH CHECK: Primary | ICD-10-CM

## 2019-07-18 PROCEDURE — 99395 PREV VISIT EST AGE 18-39: CPT | Performed by: NURSE PRACTITIONER

## 2019-07-18 NOTE — PROGRESS NOTES
BMI Counseling: There is no height or weight on file to calculate BMI  Discussed the patient's BMI with him  The BMI is above average  BMI counseling and education was provided to the patient  Nutrition recommendations include 3-5 servings of fruits/vegetables daily  Exercise recommendations include exercising 3-5 times per week

## 2019-07-18 NOTE — PATIENT INSTRUCTIONS
Normal physical exam    Cleared for sports  Wellness Visit for Adults   AMBULATORY CARE:   A wellness visit  is when you see your healthcare provider to get screened for health problems  You can also get advice on how to stay healthy  Write down your questions so you remember to ask them  Ask your healthcare provider how often you should have a wellness visit  What happens at a wellness visit:  Your healthcare provider will ask about your health, and your family history of health problems  This includes high blood pressure, heart disease, and cancer  He or she will ask if you have symptoms that concern you, if you smoke, and about your mood  You may also be asked about your intake of medicines, supplements, food, and alcohol  Any of the following may be done:  · Your weight  will be checked  Your height may also be checked so your body mass index (BMI) can be calculated  Your BMI shows if you are at a healthy weight  · Your blood pressure  and heart rate will be checked  Your temperature may also be checked  · Blood and urine tests  may be done  Blood tests may be done to check your cholesterol levels  Abnormal cholesterol levels increase your risk for heart disease and stroke  You may also need a blood or urine test to check for diabetes if you are at increased risk  Urine tests may be done to look for signs of an infection or kidney disease  · A physical exam  includes checking your heartbeat and lungs with a stethoscope  Your healthcare provider may also check your skin to look for sun damage  · Screening tests  may be recommended  A screening test is done to check for diseases that may not cause symptoms  The screening tests you may need depend on your age, gender, family history, and lifestyle habits  For example, colorectal screening may be recommended if you are 48years old or older  Screening tests you need if you are a woman:   · A Pap smear  is used to screen for cervical cancer  Pap smears are usually done every 3 to 5 years depending on your age  You may need them more often if you have had abnormal Pap smear test results in the past  Ask your healthcare provider how often you should have a Pap smear  · A mammogram  is an x-ray of your breasts to screen for breast cancer  Experts recommend mammograms every 2 years starting at age 48 years  You may need a mammogram at age 52 years or younger if you have an increased risk for breast cancer  Talk to your healthcare provider about when you should start having mammograms and how often you need them  Vaccines you may need:   · Get an influenza vaccine  every year  The influenza vaccine protects you from the flu  Several types of viruses cause the flu  The viruses change over time, so new vaccines are made each year  · Get a tetanus-diphtheria (Td) booster vaccine  every 10 years  This vaccine protects you against tetanus and diphtheria  Tetanus is a severe infection that may cause painful muscle spasms and lockjaw  Diphtheria is a severe bacterial infection that causes a thick covering in the back of your mouth and throat  · Get a human papillomavirus (HPV) vaccine  if you are female and aged 23 to 32 or male 23 to 24 and never received it  This vaccine protects you from HPV infection  HPV is the most common infection spread by sexual contact  HPV may also cause vaginal, penile, and anal cancers  · Get a pneumococcal vaccine  if you are aged 72 years or older  The pneumococcal vaccine is an injection given to protect you from pneumococcal disease  Pneumococcal disease is an infection caused by pneumococcal bacteria  The infection may cause pneumonia, meningitis, or an ear infection  · Get a shingles vaccine  if you are aged 61 or older, even if you have had shingles before  The shingles vaccine is an injection to protect you from the varicella-zoster virus  This is the same virus that causes chickenpox   Shingles is a painful rash that develops in people who had chickenpox or have been exposed to the virus  How to eat healthy:  My Plate is a model for planning healthy meals  It shows the types and amounts of foods that should go on your plate  Fruits and vegetables make up about half of your plate, and grains and protein make up the other half  A serving of dairy is included on the side of your plate  The amount of calories and serving sizes you need depends on your age, gender, weight, and height  Examples of healthy foods are listed below:  · Eat a variety of vegetables  such as dark green, red, and orange vegetables  You can also include canned vegetables low in sodium (salt) and frozen vegetables without added butter or sauces  · Eat a variety of fresh fruits , canned fruit in 100% juice, frozen fruit, and dried fruit  · Include whole grains  At least half of the grains you eat should be whole grains  Examples include whole-wheat bread, wheat pasta, brown rice, and whole-grain cereals such as oatmeal     · Eat a variety of protein foods such as seafood (fish and shellfish), lean meat, and poultry without skin (turkey and chicken)  Examples of lean meats include pork leg, shoulder, or tenderloin, and beef round, sirloin, tenderloin, and extra lean ground beef  Other protein foods include eggs and egg substitutes, beans, peas, soy products, nuts, and seeds  · Choose low-fat dairy products such as skim or 1% milk or low-fat yogurt, cheese, and cottage cheese  · Limit unhealthy fats  such as butter, hard margarine, and shortening  Exercise:  Exercise at least 30 minutes per day on most days of the week  Some examples of exercise include walking, biking, dancing, and swimming  You can also fit in more physical activity by taking the stairs instead of the elevator or parking farther away from stores  Include muscle strengthening activities 2 days each week  Regular exercise provides many health benefits   It helps you manage your weight, and decreases your risk for type 2 diabetes, heart disease, stroke, and high blood pressure  Exercise can also help improve your mood  Ask your healthcare provider about the best exercise plan for you  General health and safety guidelines:   · Do not smoke  Nicotine and other chemicals in cigarettes and cigars can cause lung damage  Ask your healthcare provider for information if you currently smoke and need help to quit  E-cigarettes or smokeless tobacco still contain nicotine  Talk to your healthcare provider before you use these products  · Limit alcohol  A drink of alcohol is 12 ounces of beer, 5 ounces of wine, or 1½ ounces of liquor  · Lose weight, if needed  Being overweight increases your risk of certain health conditions  These include heart disease, high blood pressure, type 2 diabetes, and certain types of cancer  · Protect your skin  Do not sunbathe or use tanning beds  Use sunscreen with a SPF 15 or higher  Apply sunscreen at least 15 minutes before you go outside  Reapply sunscreen every 2 hours  Wear protective clothing, hats, and sunglasses when you are outside  · Drive safely  Always wear your seatbelt  Make sure everyone in your car wears a seatbelt  A seatbelt can save your life if you are in an accident  Do not use your cell phone when you are driving  This could distract you and cause an accident  Pull over if you need to make a call or send a text message  · Practice safe sex  Use latex condoms if are sexually active and have more than one partner  Your healthcare provider may recommend screening tests for sexually transmitted infections (STIs)  · Wear helmets, lifejackets, and protective gear  Always wear a helmet when you ride a bike or motorcycle, go skiing, or play sports that could cause a head injury  Wear protective equipment when you play sports  Wear a lifejacket when you are on a boat or doing water sports    © 2017 [employer] [facility] is for End User's use only and may not be sold, redistributed or otherwise used for commercial purposes  All illustrations and images included in CareNotes® are the copyrighted property of A D [employer] M [employer]  or Aditya Oseguera  The above information is an  only  It is not intended as medical advice for individual conditions or treatments  Talk to your doctor, nurse or pharmacist before following any medical regimen to see if it is safe and effective for you

## 2019-08-02 ENCOUNTER — TELEPHONE (OUTPATIENT)
Dept: FAMILY MEDICINE CLINIC | Facility: CLINIC | Age: 34
End: 2019-08-02

## 2019-08-02 DIAGNOSIS — L23.7 POISON IVY DERMATITIS: Primary | ICD-10-CM

## 2019-08-02 RX ORDER — METHYLPREDNISOLONE 4 MG/1
TABLET ORAL
Qty: 21 EACH | Refills: 0 | Status: SHIPPED | OUTPATIENT
Start: 2019-08-02 | End: 2020-01-02

## 2019-08-02 NOTE — TELEPHONE ENCOUNTER
Pt called with poison on his arms (he sent a picture)  Would like to know if you will call in medication to [facility]  He cant come in  And does not want the baby to get it

## 2019-08-09 ENCOUNTER — CLINICAL SUPPORT (OUTPATIENT)
Dept: FAMILY MEDICINE CLINIC | Facility: CLINIC | Age: 34
End: 2019-08-09
Payer: COMMERCIAL

## 2019-08-09 ENCOUNTER — TELEPHONE (OUTPATIENT)
Dept: FAMILY MEDICINE CLINIC | Facility: CLINIC | Age: 34
End: 2019-08-09

## 2019-08-09 DIAGNOSIS — Z11.1 SCREENING-PULMONARY TB: Primary | ICD-10-CM

## 2019-08-09 DIAGNOSIS — L23.7 POISON IVY DERMATITIS: Primary | ICD-10-CM

## 2019-08-09 PROCEDURE — 86580 TB INTRADERMAL TEST: CPT

## 2019-08-09 RX ORDER — PREDNISONE 20 MG/1
40 TABLET ORAL DAILY
Qty: 10 TABLET | Refills: 0 | Status: SHIPPED | OUTPATIENT
Start: 2019-08-09 | End: 2019-08-14

## 2019-08-09 RX ORDER — TRIAMCINOLONE ACETONIDE 1 MG/G
CREAM TOPICAL 2 TIMES DAILY
Qty: 30 G | Refills: 1 | Status: SHIPPED | OUTPATIENT
Start: 2019-08-09 | End: 2020-01-02

## 2019-08-12 LAB
INDURATION: 0 MM
TB SKIN TEST: NEGATIVE

## 2019-11-25 ENCOUNTER — IMMUNIZATIONS (OUTPATIENT)
Dept: FAMILY MEDICINE CLINIC | Facility: CLINIC | Age: 34
End: 2019-11-25
Payer: COMMERCIAL

## 2019-11-25 DIAGNOSIS — Z23 NEED FOR INFLUENZA VACCINATION: Primary | ICD-10-CM

## 2019-11-25 PROCEDURE — 90471 IMMUNIZATION ADMIN: CPT | Performed by: FAMILY MEDICINE

## 2019-11-25 PROCEDURE — 90686 IIV4 VACC NO PRSV 0.5 ML IM: CPT | Performed by: FAMILY MEDICINE

## 2020-01-02 ENCOUNTER — OFFICE VISIT (OUTPATIENT)
Dept: FAMILY MEDICINE CLINIC | Facility: CLINIC | Age: 35
End: 2020-01-02
Payer: COMMERCIAL

## 2020-01-02 VITALS
SYSTOLIC BLOOD PRESSURE: 122 MMHG | OXYGEN SATURATION: 98 % | BODY MASS INDEX: 29.54 KG/M2 | HEIGHT: 71 IN | TEMPERATURE: 98.9 F | DIASTOLIC BLOOD PRESSURE: 82 MMHG | HEART RATE: 82 BPM | RESPIRATION RATE: 18 BRPM | WEIGHT: 211 LBS

## 2020-01-02 DIAGNOSIS — R05.9 COUGH: ICD-10-CM

## 2020-01-02 DIAGNOSIS — J06.9 URI WITH COUGH AND CONGESTION: Primary | ICD-10-CM

## 2020-01-02 PROCEDURE — 3008F BODY MASS INDEX DOCD: CPT | Performed by: NURSE PRACTITIONER

## 2020-01-02 PROCEDURE — 99213 OFFICE O/P EST LOW 20 MIN: CPT | Performed by: NURSE PRACTITIONER

## 2020-01-02 PROCEDURE — 1036F TOBACCO NON-USER: CPT | Performed by: NURSE PRACTITIONER

## 2020-01-02 RX ORDER — AZITHROMYCIN 250 MG/1
TABLET, FILM COATED ORAL
Qty: 6 TABLET | Refills: 0 | Status: SHIPPED | OUTPATIENT
Start: 2020-01-02 | End: 2020-01-06

## 2020-01-02 RX ORDER — BROMPHENIRAMINE MALEATE, PSEUDOEPHEDRINE HYDROCHLORIDE, AND DEXTROMETHORPHAN HYDROBROMIDE 2; 30; 10 MG/5ML; MG/5ML; MG/5ML
5 SYRUP ORAL 4 TIMES DAILY PRN
Qty: 120 ML | Refills: 0 | Status: SHIPPED | OUTPATIENT
Start: 2020-01-02 | End: 2020-01-09

## 2020-01-02 NOTE — PATIENT INSTRUCTIONS
URI with cough- likely viral  Infant son is home with croup   Treat prophylactic  Continue over the counter mucinex for cough

## 2020-01-02 NOTE — PROGRESS NOTES
Assessment/Plan:       Diagnoses and all orders for this visit:    URI with cough and congestion  -     azithromycin (ZITHROMAX) 250 mg tablet; Take 2 tablets today then 1 tablet daily x 4 days    Cough  -     brompheniramine-pseudoephedrine-DM 30-2-10 MG/5ML syrup; Take 5 mL by mouth 4 (four) times a day as needed for congestion or cough for up to 7 days        No problem-specific Assessment & Plan notes found for this encounter  Subjective:      Patient ID: Harl Apgar is a 37 y o  male  Patient is here with cough and cold symptoms  HE has been sick for about 10 days  He has taken Mucinex DM over the counter  His infant son dx with croup  The following portions of the patient's history were reviewed and updated as appropriate:   He has a past medical history of Anxiety and Herniated lumbar intervertebral disc ,  does not have any pertinent problems on file  ,   has a past surgical history that includes Knee surgery ()  ,  family history includes Cancer in his mother  ,   reports that he has never smoked  He has never used smokeless tobacco  He reports that he drinks alcohol  He reports that he does not use drugs  ,  has No Known Allergies     Current Outpatient Medications   Medication Sig Dispense Refill    azithromycin (ZITHROMAX) 250 mg tablet Take 2 tablets today then 1 tablet daily x 4 days 6 tablet 0    brompheniramine-pseudoephedrine-DM 30-2-10 MG/5ML syrup Take 5 mL by mouth 4 (four) times a day as needed for congestion or cough for up to 7 days 120 mL 0     No current facility-administered medications for this visit  Review of Systems   Constitutional: Negative for fatigue and fever  HENT: Positive for congestion and rhinorrhea  Negative for postnasal drip, sinus pressure, sinus pain and sore throat  Eyes: Negative for discharge and redness  Respiratory: Positive for cough  Negative for shortness of breath and wheezing  Cardiovascular: Negative for chest pain  Gastrointestinal: Negative for abdominal pain  Skin: Negative for color change and rash  Allergic/Immunologic: Negative for immunocompromised state  Neurological: Negative for headaches  Hematological: Negative for adenopathy  All other systems reviewed and are negative  Objective:  Vitals:    01/02/20 1551   BP: 122/82   BP Location: Left arm   Patient Position: Sitting   Pulse: 82   Resp: 18   Temp: 98 9 °F (37 2 °C)   SpO2: 98%   Weight: 95 7 kg (211 lb)   Height: 5' 11" (1 803 m)     Body mass index is 29 43 kg/m²  Physical Exam   Constitutional: He is oriented to person, place, and time  He appears well-developed and well-nourished  No distress  HENT:   Head: Normocephalic and atraumatic  Right Ear: External ear normal    Left Ear: External ear normal    Nose: Nose normal    Mouth/Throat: Oropharynx is clear and moist  No oropharyngeal exudate  Eyes: Pupils are equal, round, and reactive to light  Conjunctivae are normal  Right eye exhibits no discharge  Left eye exhibits no discharge  Neck: Normal range of motion  Neck supple  Cardiovascular: Normal rate, regular rhythm and normal heart sounds  Exam reveals no gallop and no friction rub  No murmur heard  Pulmonary/Chest: Effort normal and breath sounds normal  No respiratory distress  He has no wheezes  He exhibits no tenderness  Abdominal: Soft  Musculoskeletal: Normal range of motion  He exhibits no edema  Lymphadenopathy:     He has no cervical adenopathy  Neurological: He is alert and oriented to person, place, and time  Skin: Skin is warm and dry  No rash noted  He is not diaphoretic  No erythema  Psychiatric: He has a normal mood and affect  His behavior is normal  Judgment and thought content normal    Nursing note and vitals reviewed

## 2020-06-17 DIAGNOSIS — M54.9 CHRONIC BACK PAIN, UNSPECIFIED BACK LOCATION, UNSPECIFIED BACK PAIN LATERALITY: Primary | ICD-10-CM

## 2020-06-17 DIAGNOSIS — G89.29 CHRONIC BACK PAIN, UNSPECIFIED BACK LOCATION, UNSPECIFIED BACK PAIN LATERALITY: Primary | ICD-10-CM

## 2020-08-17 ENCOUNTER — LAB REQUISITION (OUTPATIENT)
Dept: LAB | Facility: HOSPITAL | Age: 35
End: 2020-08-17
Payer: COMMERCIAL

## 2020-08-17 DIAGNOSIS — M54.50 LOW BACK PAIN: ICD-10-CM

## 2020-08-17 PROCEDURE — 88304 TISSUE EXAM BY PATHOLOGIST: CPT | Performed by: PATHOLOGY

## 2021-04-21 ENCOUNTER — TELEPHONE (OUTPATIENT)
Dept: FAMILY MEDICINE CLINIC | Facility: CLINIC | Age: 36
End: 2021-04-21

## 2021-04-21 DIAGNOSIS — H10.9 BACTERIAL CONJUNCTIVITIS: Primary | ICD-10-CM

## 2021-04-21 RX ORDER — OFLOXACIN 3 MG/ML
1 SOLUTION/ DROPS OPHTHALMIC 4 TIMES DAILY
Qty: 5 ML | Refills: 0 | Status: SHIPPED | OUTPATIENT
Start: 2021-04-21 | End: 2021-04-28

## 2022-01-27 NOTE — RESULT NOTES
Message   Lyme test is negative      Verified Results  (1) LYME ANTIBODY PROFILE W/REFLEX TO WESTERN BLOT [id number] 09:55AM Iona Haddad Order Number: [id number]     Test Name Result Flag Reference   LYME IGG 0 10  0 00-0 79   NEGATIVE(0 00-0 79)-Absence of detectable Borrelia IgG Antibodies  A negative result does not exclude the possibility of Borrelia infection  If early Lyme disease is suspected,a second sample should be collected & tested 4 weeks after initial testing  LYME IGM 0 27  0 00-0 79   NEGATIVE (0 00-0 79)-Absence of detectable Borrelia IgM antibodies  A negative result does not exclude the possibility of Borrelia infection  If early lyme disease is suspected, a second sample should be collected & tested 4 weeks after initial testing  Radiology called with significant findings on the right foot xray   Uploaded in to [facility]